# Patient Record
Sex: FEMALE | Race: WHITE | NOT HISPANIC OR LATINO | URBAN - METROPOLITAN AREA
[De-identification: names, ages, dates, MRNs, and addresses within clinical notes are randomized per-mention and may not be internally consistent; named-entity substitution may affect disease eponyms.]

---

## 2022-11-26 ENCOUNTER — APPOINTMENT (OUTPATIENT)
Dept: RADIOLOGY | Facility: MEDICAL CENTER | Age: 87
End: 2022-11-26
Attending: OTOLARYNGOLOGY
Payer: COMMERCIAL

## 2023-04-19 ENCOUNTER — OFFICE VISIT (OUTPATIENT)
Dept: URGENT CARE | Facility: CLINIC | Age: 88
End: 2023-04-19
Payer: COMMERCIAL

## 2023-04-19 VITALS
DIASTOLIC BLOOD PRESSURE: 80 MMHG | SYSTOLIC BLOOD PRESSURE: 130 MMHG | BODY MASS INDEX: 16.83 KG/M2 | HEIGHT: 63 IN | WEIGHT: 95 LBS | TEMPERATURE: 97.9 F | HEART RATE: 75 BPM | OXYGEN SATURATION: 97 % | RESPIRATION RATE: 18 BRPM

## 2023-04-19 DIAGNOSIS — H61.21 IMPACTED CERUMEN OF RIGHT EAR: ICD-10-CM

## 2023-04-19 PROCEDURE — 99202 OFFICE O/P NEW SF 15 MIN: CPT | Performed by: PHYSICIAN ASSISTANT

## 2023-04-19 NOTE — PROGRESS NOTES
"Subjective:   Alannah Ssoa is a 87 y.o. female who presents for Ear Fullness (Right ear fullness, X14 days. )      HPI  The patient presents to the Urgent Care with complaints of right ear fullness onset a couple weeks.  Decreased hearing.  Possible earwax.  Denies any pain or drainage.  No recent illness such as fevers, cough.        Medications:    Eszopiclone Tabs    Allergies: Asa [aspirin], Codeine, Epinephrine, and Phenol    Problem List: Alannah Sosa does not have any pertinent problems on file.    Surgical History:  Past Surgical History:   Procedure Laterality Date    APPENDECTOMY      1974    HYSTERECTOMY, TOTAL ABDOMINAL      1974       Past Social Hx: Alannah Sosa  reports that she has never smoked. She has never used smokeless tobacco. She reports current alcohol use of about 0.5 - 1.0 oz per week. She reports that she does not use drugs.     Past Family Hx:  Alannah Sosa family history includes Heart Disease in her father; Stroke in her mother.     Problem list, medications, and allergies reviewed by myself today in Epic.     Objective:     /80 (BP Location: Left arm, Patient Position: Sitting, BP Cuff Size: Adult)   Pulse 75   Temp 36.6 °C (97.9 °F) (Temporal)   Resp 18   Ht 1.6 m (5' 3\")   Wt 43.1 kg (95 lb)   SpO2 97%   BMI 16.83 kg/m²     Physical Exam  Vitals reviewed.   Constitutional:       General: She is not in acute distress.     Appearance: Normal appearance. She is not ill-appearing or toxic-appearing.   HENT:      Right Ear: There is impacted cerumen.      Left Ear: Tympanic membrane and ear canal normal.   Eyes:      Conjunctiva/sclera: Conjunctivae normal.      Pupils: Pupils are equal, round, and reactive to light.   Cardiovascular:      Rate and Rhythm: Normal rate.   Pulmonary:      Effort: Pulmonary effort is normal.   Musculoskeletal:      Cervical back: Neck supple.   Skin:     General: Skin is warm and dry.   Neurological:      " General: No focal deficit present.      Mental Status: She is alert and oriented to person, place, and time.   Psychiatric:         Mood and Affect: Mood normal.         Behavior: Behavior normal.       Diagnosis and associated orders:     1. Impacted cerumen of right ear       Comments/MDM:     Cerumen was successfully disimpacted with irrigation by MA.  No complications.  Reevaluation showed normal ear canal with TM with perforation. 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 She complains of no pain. If any ear pain, swelling, dizziness, fevers, or ear drainage, return to the  promptly.   Patient states they can hear a lot better.   Avoid inserting Q-tips inside ear canal.   Patient has no further questions or concerns.       I personally reviewed prior external notes and test results pertinent to today's visit. Pathogenesis of diagnosis discussed including typical length and natural progression. Supportive care, natural history, differential diagnoses, and indications for immediate follow-up discussed. Patient expresses understanding and agrees to plan. Patient denies any other questions or concerns.     Follow-up with the primary care physician for recheck, reevaluation, and consideration of further management.    Please note that this dictation was created using voice recognition software. I have made a reasonable attempt to correct obvious errors, but I expect that there are errors of grammar and possibly content that I did not discover before finalizing the note.    This note was electronically signed by Basilio Saavedra PA-C